# Patient Record
Sex: MALE | Race: WHITE | URBAN - METROPOLITAN AREA
[De-identification: names, ages, dates, MRNs, and addresses within clinical notes are randomized per-mention and may not be internally consistent; named-entity substitution may affect disease eponyms.]

---

## 2017-02-11 ENCOUNTER — OFFICE VISIT (OUTPATIENT)
Dept: FAMILY MEDICINE CLINIC | Facility: CLINIC | Age: 52
End: 2017-02-11

## 2017-02-11 VITALS
RESPIRATION RATE: 16 BRPM | DIASTOLIC BLOOD PRESSURE: 72 MMHG | TEMPERATURE: 98 F | SYSTOLIC BLOOD PRESSURE: 124 MMHG | HEART RATE: 72 BPM | OXYGEN SATURATION: 99 %

## 2017-02-11 DIAGNOSIS — J40 BRONCHITIS: Primary | ICD-10-CM

## 2017-02-11 PROCEDURE — 99212 OFFICE O/P EST SF 10 MIN: CPT | Performed by: NURSE PRACTITIONER

## 2017-02-11 RX ORDER — BENZONATATE 200 MG/1
200 CAPSULE ORAL 3 TIMES DAILY PRN
Qty: 20 CAPSULE | Refills: 0 | Status: SHIPPED | OUTPATIENT
Start: 2017-02-11 | End: 2017-02-25

## 2017-02-11 NOTE — PROGRESS NOTES
CHIEF COMPLAINT:   Patient presents with:  Cough      HPI:   Claudeen Burn is a 54year old male with hx of asthma who presents for cough for dry cough for 3 days. Has some sputum in morning but clears quickly and does not return.  Earlier in the week he had EYES: anicteric, sclera white, conjunctiva clear, pupils PERRLA, EOMs intact  HEENT: Atraumatic, normocephalic. Pupils PERRLA, conjunctiva clear. TM's pearly gray bilaterally; no erythema or effusion. Nostrils patent, nasal mucosa pink and non-inflamed.  [place] Signed Prescriptions Disp Refills    Phenyleph-Promethazine-Cod (PROMETHAZINE VC/CODEINE) 5-6.25-10 MG/5ML Oral Syrup 240 mL 0      Sig: Take 10 mL by mouth every 8 (eight) hours as needed.       benzonatate 200 MG Oral Cap 20 capsule 0      Sig: Take 1 cap Your healthcare provider will examine you and ask about your symptoms and health history. You may also have a sputum culture to test the fluid in your lungs. Chest X-rays may be done to look for infection in the lungs.   Treating acute bronchitis  [employer] © 3272-0204 [address], [address] [place]. All rights reserved. This information is not intended as a substitute for professional medical care. Always follow your healthcare professional's instructions.

## 2017-02-11 NOTE — PATIENT INSTRUCTIONS
Continue Mucinex and Robitussin as needed  Increase your fluid intake, drink at 1-2 liters of water per day  Use prescription cough syrup as needed.  It may make you drowsy, do not take while working or driving  Be re-evaluated for new or worsening sympto · Take medicine as directed. You may be told to take ibuprofen or other over-the-counter medicines. These help relieve inflammation in your bronchial tubes. Your doctor may prescribe an inhaler to help open up the bronchial tubes.  Most of the time, acute b

## 2017-11-30 ENCOUNTER — OFFICE VISIT (OUTPATIENT)
Dept: INTERNAL MEDICINE CLINIC | Facility: CLINIC | Age: 52
End: 2017-11-30

## 2017-11-30 VITALS
WEIGHT: 148 LBS | OXYGEN SATURATION: 95 % | HEIGHT: 68 IN | DIASTOLIC BLOOD PRESSURE: 67 MMHG | SYSTOLIC BLOOD PRESSURE: 107 MMHG | BODY MASS INDEX: 22.43 KG/M2 | HEART RATE: 58 BPM

## 2017-11-30 DIAGNOSIS — Z00.00 ANNUAL PHYSICAL EXAM: Primary | ICD-10-CM

## 2017-11-30 DIAGNOSIS — H93.13 TINNITUS OF BOTH EARS: ICD-10-CM

## 2017-11-30 DIAGNOSIS — Z53.20 COLONOSCOPY REFUSED: ICD-10-CM

## 2017-11-30 DIAGNOSIS — J45.20 MILD INTERMITTENT ASTHMA WITHOUT COMPLICATION: ICD-10-CM

## 2017-11-30 PROCEDURE — 99213 OFFICE O/P EST LOW 20 MIN: CPT | Performed by: INTERNAL MEDICINE

## 2017-11-30 PROCEDURE — 99212 OFFICE O/P EST SF 10 MIN: CPT | Performed by: INTERNAL MEDICINE

## 2017-11-30 RX ORDER — ALBUTEROL SULFATE 90 UG/1
1 AEROSOL, METERED RESPIRATORY (INHALATION) EVERY 6 HOURS PRN
Qty: 1 INHALER | Refills: 2 | Status: SHIPPED | OUTPATIENT
Start: 2017-11-30

## 2017-11-30 NOTE — PATIENT INSTRUCTIONS
ASSESSMENT/PLAN:   Annual physical exam  (primary encounter diagnosis)- exam is ok, will bring labs from out pt  Mild intermittent asthma without complication- controlled,   Tinnitus of both ears- same, pt has seen ENT  Colonoscopy refused- will get occult

## 2017-11-30 NOTE — PROGRESS NOTES
HPI:    Patient ID: Deidra Bryan is a 50year old male. HPI  Pt comes in for annual exam. Over all doing well, the only complaint of the ringing in ears, has seen ENT Dr Annabel Francisco before and no treatment.  The asthma is well controlled, pt usually in the winte Social History: Smoking status: Smoker, Current Status Unknown                                             Packs/day: 0.00      Years: 10.00     Smokeless tobacco: Never Used                      Alcohol use:  Yes              Comment: occasionally Referrals:  None        AZ#[id number]

## 2018-06-21 PROBLEM — F32.A DEPRESSION: Status: ACTIVE | Noted: 2018-06-21

## 2018-06-21 PROBLEM — F41.1 GENERALIZED ANXIETY DISORDER: Status: ACTIVE | Noted: 2018-06-21

## 2018-06-27 NOTE — PROGRESS NOTES
HPI:    Patient ID: Roxie Meade is a 48year old male. HPI  Patient comes here today to discuss potential treatment for his depression anxiety.   As per patient in the past has seen a psychiatrist and was tried different medication but none of them helpe SURGICAL HISTORY      Comment: jaw surgery to correct bite   Family History   Problem Relation Age of Onset   • Cancer Father      lung   • Heart Disease Mother      CAD   • Colon Cancer Maternal Grandmother    • Breast Cancer Paternal Grandmother       So none of them helped he is interested in being treated with ketamine for his depression but does not want to get IV ketamine treatments would like to do nasal spray.   Unfortunately those are not really available around here in [place] mostly in [facility] and [facility]

## (undated) NOTE — MR AVS SNAPSHOT
[address] [facility] [address]  [facility] [place] [address] [phone]               Thank you for choosing us for your health care visit with KRISTIN Stafford.   We are glad to serve you and happy to provide you with this summary of your vis When bronchitis develops, the airways become swollen. The airways may also become infected with bacteria. This is known as a secondary infection.   Diagnosing acute bronchitis  Your healthcare provider will examine you and ask about your symptoms and health taking antibiotics   Date Last Reviewed: 8/4/2014  © 4484-9346 [facility] [address] [facility], [address] [place], [place] [address]. All rights reserved. This information is not intended as a substitute for professional medical care.  Always follow your hea Your unique [employer] Access Code: [email or web address]  Expires: 4/12/2017  9:37 AM    If you have questions, you can call [phone] to talk to our [facility] Staff. Remember, [employer] is NOT to be used for urgent needs. For medical emergencies, dial 911.

## (undated) NOTE — LETTER
ASTHMA ACTION PLAN for Yadira Cabrera     : 3/14/1965     Date: 17  Doctor:  Jesse Randhawa MD  Phone for doctor or clinic: [facility] [place] [address] [address] [facility] [address] [phone]      ACT Score: 22    ACT Goal: 20 If your symptoms do not improve in ONE hour -  go to the emergency room or call 911 immediately! If symptoms improve, call office for appointment immediately.     {Choose Albuterol/Xopenex INHALER or NEBULIZER every 20 min:4298}       Don't forget:  · [place]